# Patient Record
Sex: FEMALE | Race: WHITE | NOT HISPANIC OR LATINO | Employment: UNEMPLOYED | URBAN - METROPOLITAN AREA
[De-identification: names, ages, dates, MRNs, and addresses within clinical notes are randomized per-mention and may not be internally consistent; named-entity substitution may affect disease eponyms.]

---

## 2017-02-15 ENCOUNTER — HISTORICAL (OUTPATIENT)
Dept: LAB | Facility: HOSPITAL | Age: 27
End: 2017-02-15

## 2017-03-14 ENCOUNTER — HISTORICAL (OUTPATIENT)
Dept: LAB | Facility: HOSPITAL | Age: 27
End: 2017-03-14

## 2020-09-10 ENCOUNTER — HISTORICAL (OUTPATIENT)
Dept: RADIOLOGY | Facility: HOSPITAL | Age: 30
End: 2020-09-10

## 2022-09-27 ENCOUNTER — HOSPITAL ENCOUNTER (EMERGENCY)
Facility: HOSPITAL | Age: 32
Discharge: HOME | End: 2022-09-27
Attending: EMERGENCY MEDICINE
Payer: MEDICAID

## 2022-09-27 VITALS
BODY MASS INDEX: 27.68 KG/M2 | HEART RATE: 101 BPM | TEMPERATURE: 98 F | OXYGEN SATURATION: 98 % | SYSTOLIC BLOOD PRESSURE: 126 MMHG | WEIGHT: 141 LBS | HEIGHT: 60 IN | DIASTOLIC BLOOD PRESSURE: 83 MMHG | RESPIRATION RATE: 18 BRPM

## 2022-09-27 DIAGNOSIS — J02.9 PHARYNGITIS, UNSPECIFIED ETIOLOGY: Primary | ICD-10-CM

## 2022-09-27 LAB — STREP A PCR (OHS): NOT DETECTED

## 2022-09-27 PROCEDURE — 99284 EMERGENCY DEPT VISIT MOD MDM: CPT | Mod: 25

## 2022-09-27 PROCEDURE — 96372 THER/PROPH/DIAG INJ SC/IM: CPT

## 2022-09-27 PROCEDURE — 63600175 PHARM REV CODE 636 W HCPCS

## 2022-09-27 PROCEDURE — 87631 RESP VIRUS 3-5 TARGETS: CPT | Performed by: EMERGENCY MEDICINE

## 2022-09-27 RX ORDER — PREDNISONE 20 MG/1
20 TABLET ORAL DAILY
Qty: 5 TABLET | Refills: 0 | Status: SHIPPED | OUTPATIENT
Start: 2022-09-27 | End: 2022-10-02

## 2022-09-27 RX ORDER — DEXAMETHASONE SODIUM PHOSPHATE 4 MG/ML
8 INJECTION, SOLUTION INTRA-ARTICULAR; INTRALESIONAL; INTRAMUSCULAR; INTRAVENOUS; SOFT TISSUE
Status: COMPLETED | OUTPATIENT
Start: 2022-09-27 | End: 2022-09-27

## 2022-09-27 RX ADMIN — DEXAMETHASONE SODIUM PHOSPHATE 8 MG: 4 INJECTION, SOLUTION INTRA-ARTICULAR; INTRALESIONAL; INTRAMUSCULAR; INTRAVENOUS; SOFT TISSUE at 04:09

## 2022-09-27 NOTE — Clinical Note
"Rowena Frencha"Devonte was seen and treated in our emergency department on 9/27/2022.  She may return to work on 09/30/2022.       If you have any questions or concerns, please don't hesitate to call.      Lilo ALBARRAN    "

## 2022-09-27 NOTE — ED PROVIDER NOTES
Encounter Date: 9/27/2022       History     Chief Complaint   Patient presents with    Sore Throat     C/o sore throat pain for a few days and recent strep diagnosis for her child     The patient is a 32 y.o. female with no past medical history who presents to the Emergency Department with a chief complaint of sore throat. Symptoms began on Sunday and have been constant since onset. Her throat pain is currently rated as a 7/10 in severity and described as aching with no radiation. Associated symptoms include fever two days ago. Symptoms are aggravated with swallowing and there are no alleviating factors. The patient denies difficulty tolerating secretions. She reports taking nothing prior to arrival with no relief of symptoms. No other reported symptoms at this time. Her son tested positive for strep today.     The history is provided by the patient.   Sore Throat   This is a new problem. The sore throat symptoms include sore throat and fever.The current episode started two days ago. The problem has been unchanged. The maximum temperature recorded prior to her arrival was 100 - 100.9 F. The fever has been present for Less than 1 day. The pain is at a severity of 7/10. Pertinent negatives include no congestion, coughing, ear discharge, ear pain, headaches, neck pain, shortness of breath, stridor, trouble swallowing or vomiting. She has had exposure to strep. She has tried nothing for the symptoms. The treatment provided no relief.   Review of patient's allergies indicates:   Allergen Reactions    Codeine      History reviewed. No pertinent past medical history.  History reviewed. No pertinent surgical history.  No family history on file.     Review of Systems   HENT:  Positive for sore throat. Negative for congestion, ear discharge, ear pain, postnasal drip, rhinorrhea, sinus pressure, trouble swallowing and voice change.    Eyes:  Negative for discharge and itching.   Respiratory:  Negative for cough, shortness of  breath, wheezing and stridor.    Gastrointestinal:  Negative for vomiting.   Musculoskeletal:  Negative for neck pain.   Skin:  Negative for pallor and rash.   Neurological:  Negative for headaches.   All other systems reviewed and are negative.    Physical Exam     Initial Vitals [09/27/22 1610]   BP Pulse Resp Temp SpO2   126/83 101 18 97.7 °F (36.5 °C) 98 %      MAP       --         Physical Exam    Nursing note and vitals reviewed.  Constitutional: Vital signs are normal. She appears well-developed and well-nourished.   HENT:   Head: Normocephalic.   Right Ear: Tympanic membrane normal.   Left Ear: Tympanic membrane normal.   Nose: No sinus tenderness.   Mouth/Throat: Uvula is midline and mucous membranes are normal. No uvula swelling. Posterior oropharyngeal erythema present. No oropharyngeal exudate or tonsillar abscesses.   Enlarged tonsils   Eyes: Pupils are equal, round, and reactive to light.   Cardiovascular:  Normal rate, regular rhythm, normal heart sounds, intact distal pulses and normal pulses.           Pulmonary/Chest: Effort normal and breath sounds normal.     Lymphadenopathy:     She has no cervical adenopathy.   Neurological: She is alert. GCS eye subscore is 4. GCS verbal subscore is 5. GCS motor subscore is 6.   Skin: Skin is warm. Capillary refill takes less than 2 seconds.       ED Course   Procedures  Labs Reviewed   STREP GROUP A BY PCR - Normal          Imaging Results    None          Medications   dexamethasone injection 8 mg (8 mg Intramuscular Given 9/27/22 1630)     Medical Decision Making:   Differential Diagnosis:   Strep, pharyngitis, URI  Clinical Tests:   Lab Tests: Ordered and Reviewed  ED Management:  The patient is a 32 y.o. female who presents to the ED with a chief complaint of throat pain. The patient's initial vital signs reviewed. Upon arrival, he appears nontoxic . Epic records were reviewed. Multiple diagnosis considered. Physical examination was significant for  enlarged tonsils and posterior oropharyngeal erythema. Evaluation consist of rapid strep with negative results. She declined COVID testing. The patient was provided with decadron resulting in improvement of symptoms.  The patient's vital signs and condition are stable while undergoing evaluation in the ED.  The patient was advised to follow up with her primary care provider. It was recommended for the patient to call her PCP to schedule a follow up appointment and to return to the Emergency Department as needed if symptoms worsen. The patient was provided prescriptions for prednisone. The patient's vital signs and condition were closely observed while undergoing evaluation in the Emergency Department. The patient agreed with the plan for care. All questions and concerns were addressed                           Clinical Impression:   Final diagnoses:  [J02.9] Pharyngitis, unspecified etiology (Primary)      ED Disposition Condition    Discharge Stable          ED Prescriptions       Medication Sig Dispense Start Date End Date Auth. Provider    predniSONE (DELTASONE) 20 MG tablet Take 1 tablet (20 mg total) by mouth once daily. for 5 days 5 tablet 9/27/2022 10/2/2022 Coco Licona NP          Follow-up Information       Follow up With Specialties Details Why Contact Info    Primary Care Provider  Schedule an appointment as soon as possible for a visit  As needed, If symptoms worsen              Coco Licona NP  09/27/22 [id number]       Coco Licona NP  09/27/22 0550

## 2022-12-11 ENCOUNTER — HOSPITAL ENCOUNTER (EMERGENCY)
Facility: HOSPITAL | Age: 32
Discharge: HOME | End: 2022-12-11
Attending: FAMILY MEDICINE
Payer: MEDICAID

## 2022-12-11 VITALS
BODY MASS INDEX: 28.07 KG/M2 | OXYGEN SATURATION: 98 % | TEMPERATURE: 98 F | SYSTOLIC BLOOD PRESSURE: 123 MMHG | WEIGHT: 143 LBS | HEIGHT: 60 IN | DIASTOLIC BLOOD PRESSURE: 80 MMHG | HEART RATE: 88 BPM | RESPIRATION RATE: 20 BRPM
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
## 2022-12-11 DIAGNOSIS — M54.6 ACUTE BILATERAL THORACIC BACK PAIN: ICD-10-CM

## 2022-12-11 DIAGNOSIS — R51.9 NONINTRACTABLE HEADACHE, UNSPECIFIED CHRONICITY PATTERN, UNSPECIFIED HEADACHE TYPE: Primary | ICD-10-CM

## 2022-12-11 PROCEDURE — 99284 EMERGENCY DEPT VISIT MOD MDM: CPT

## 2022-12-11 PROCEDURE — 25000003 PHARM REV CODE 250: Performed by: PHYSICIAN ASSISTANT

## 2022-12-11 RX ORDER — KETOROLAC TROMETHAMINE 10 MG/1
10 TABLET, FILM COATED ORAL
Status: COMPLETED | OUTPATIENT
Start: 2022-12-11 | End: 2022-12-11

## 2022-12-11 RX ORDER — MEDROXYPROGESTERONE ACETATE 150 MG/ML
INJECTION, SUSPENSION INTRAMUSCULAR
COMMUNITY
Start: 2022-09-19

## 2022-12-11 RX ORDER — TIZANIDINE 4 MG/1
4 TABLET ORAL EVERY 8 HOURS
Qty: 21 TABLET | Refills: 0 | Status: SHIPPED | OUTPATIENT
Start: 2022-12-11 | End: 2022-12-18

## 2022-12-11 RX ORDER — DICLOFENAC SODIUM 50 MG/1
50 TABLET, DELAYED RELEASE ORAL 3 TIMES DAILY
Qty: 21 TABLET | Refills: 0 | Status: SHIPPED | OUTPATIENT
Start: 2022-12-11 | End: 2022-12-18

## 2022-12-11 RX ADMIN — KETOROLAC TROMETHAMINE 10 MG: 10 TABLET, FILM COATED ORAL at 02:12
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
## 2022-12-11 NOTE — DISCHARGE INSTRUCTIONS
Use ice and heat therapy, 20 minutes on and 20 minutes off.    You have been prescribed Diclofenac for pain. This is an Non-Steroidal Anti-Inflammatory (NSAID) Medication. Please do not take any additional NSAIDs while you are taking this medication including (Advil, Aleve, Motrin, Ibuprofen, Mobic\meloxicam, Naprosyn, Toradol, ketoralac, etc.). Please stop taking this medication if you experience: weakness, itching, yellow skin or eyes, joint pains, vomiting blood, blood or black stools, unusual weight gain, or swelling in your arms, legs, hands, or feet.     You have been prescribed Tizanidine for muscle spasms/pain. Please do not take this medication while working, drinking alcohol, swimming, or while driving/operating heavy machinery. This medication may cause drowsiness, dizziness, impair judgment, and reduce physical capabilities.You should not drive, operate heavy machinery, or make life changing decisions while taking this medication.
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
## 2023-01-19 ENCOUNTER — HOSPITAL ENCOUNTER (EMERGENCY)
Facility: HOSPITAL | Age: 33
Discharge: HOME | End: 2023-01-19
Attending: STUDENT IN AN ORGANIZED HEALTH CARE EDUCATION/TRAINING PROGRAM
Payer: MEDICAID

## 2023-01-19 VITALS
SYSTOLIC BLOOD PRESSURE: 108 MMHG | HEIGHT: 60 IN | RESPIRATION RATE: 18 BRPM | BODY MASS INDEX: 28.13 KG/M2 | OXYGEN SATURATION: 99 % | DIASTOLIC BLOOD PRESSURE: 78 MMHG | TEMPERATURE: 98 F | HEART RATE: 80 BPM | WEIGHT: 143.31 LBS

## 2023-01-19 DIAGNOSIS — L23.7 CONTACT DERMATITIS DUE TO POISON IVY: Primary | ICD-10-CM

## 2023-01-19 PROCEDURE — 63600175 PHARM REV CODE 636 W HCPCS: Performed by: PHYSICIAN ASSISTANT

## 2023-01-19 PROCEDURE — 96372 THER/PROPH/DIAG INJ SC/IM: CPT | Performed by: PHYSICIAN ASSISTANT

## 2023-01-19 PROCEDURE — 99284 EMERGENCY DEPT VISIT MOD MDM: CPT

## 2023-01-19 RX ORDER — PREDNISONE 20 MG/1
20 TABLET ORAL DAILY
Qty: 3 TABLET | Refills: 0 | Status: SHIPPED | OUTPATIENT
Start: 2023-01-19 | End: 2023-01-22

## 2023-01-19 RX ORDER — DEXAMETHASONE SODIUM PHOSPHATE 4 MG/ML
8 INJECTION, SOLUTION INTRA-ARTICULAR; INTRALESIONAL; INTRAMUSCULAR; INTRAVENOUS; SOFT TISSUE
Status: COMPLETED | OUTPATIENT
Start: 2023-01-19 | End: 2023-01-19

## 2023-01-19 RX ADMIN — DEXAMETHASONE SODIUM PHOSPHATE 8 MG: 4 INJECTION, SOLUTION INTRA-ARTICULAR; INTRALESIONAL; INTRAMUSCULAR; INTRAVENOUS; SOFT TISSUE at 08:01

## 2023-01-20 NOTE — ED PROVIDER NOTES
Encounter Date: 2023       History     Chief Complaint   Patient presents with    Rash     Rash to arms. States its poison ivy     See MDM    The history is provided by the patient. No  was used.   Review of patient's allergies indicates:   Allergen Reactions    Codeine      No past medical history on file.  Past Surgical History:   Procedure Laterality Date     SECTION       SECTION       No family history on file.  Social History     Tobacco Use    Smoking status: Never    Smokeless tobacco: Never     Review of Systems   Constitutional:  Negative for fever.   HENT:  Negative for sore throat.    Respiratory:  Negative for shortness of breath.    Cardiovascular:  Negative for chest pain.   Gastrointestinal:  Negative for nausea.   Genitourinary:  Negative for dysuria.   Musculoskeletal:  Positive for back pain and myalgias.   Skin:  Negative for rash.   Neurological:  Negative for weakness.   Hematological:  Does not bruise/bleed easily.   All other systems reviewed and are negative.    Physical Exam     Initial Vitals [23]   BP Pulse Resp Temp SpO2   108/78 80 18 98.3 °F (36.8 °C) 99 %      MAP       --         Physical Exam    Nursing note and vitals reviewed.  Constitutional: She appears well-developed and well-nourished.   HENT:   Head: Normocephalic and atraumatic.   Eyes: EOM are normal. Pupils are equal, round, and reactive to light.   Neck: Neck supple.   Cardiovascular:  Normal rate and regular rhythm.           Pulmonary/Chest: Breath sounds normal. She has no wheezes.   Musculoskeletal:         General: Normal range of motion.      Cervical back: Neck supple.     Neurological: She is alert and oriented to person, place, and time. She has normal strength. GCS score is 15. GCS eye subscore is 4. GCS verbal subscore is 5. GCS motor subscore is 6.   Skin: Skin is warm and dry.        Psychiatric: She has a normal mood and affect.       ED Course    Procedures  Labs Reviewed - No data to display       Imaging Results    None          Medications   dexAMETHasone injection 8 mg (8 mg Intramuscular Given 1/19/23 2018)     Medical Decision Making:   Initial Assessment:   32-year-old female presents to the ED with rash to left arm secondary to contact with poison ivy onset 1 week ago.  Patient states she is allergic to poison ivy and notes she had similar episode a while back, however notes it was much worse and more diffuse.  States she went near the same area in her backyard and picked up some bricks however did not realize she was touching the plan.  States she is been doing calamine lotion at home with no relief.  Patient is requesting steroid shot at this time.  Differential Diagnosis:   Rash, contact dermatitis, blistering  Clinical Tests:   Lab Tests: Ordered and Reviewed                            Clinical Impression:   Final diagnoses:  [L23.7] Contact dermatitis due to poison ivy (Primary)        ED Disposition Condition    Discharge Stable          ED Prescriptions       Medication Sig Dispense Start Date End Date Auth. Provider    predniSONE (DELTASONE) 20 MG tablet Take 1 tablet (20 mg total) by mouth once daily. for 3 days 3 tablet 1/19/2023 1/22/2023 Rogelio Coleman PA-C          Follow-up Information       Follow up With Specialties Details Why Contact Info    [facility] - Emergency Dept Emergency Medicine In 1 week If symptoms worsen [id number]  [facility] [id number] [phone]    Primary care provider  In 2 days As needed              Rogelio Coleman PA-C  01/19/23 9835

## 2023-10-19 DIAGNOSIS — K42.9 UMBILICAL HERNIA WITHOUT OBSTRUCTION OR GANGRENE: Primary | ICD-10-CM

## 2023-11-07 ENCOUNTER — OFFICE VISIT (OUTPATIENT)
Dept: SURGERY | Facility: CLINIC | Age: 33
End: 2023-11-07
Payer: MEDICAID

## 2023-11-07 VITALS
HEIGHT: 60 IN | DIASTOLIC BLOOD PRESSURE: 77 MMHG | WEIGHT: 142 LBS | SYSTOLIC BLOOD PRESSURE: 123 MMHG | RESPIRATION RATE: 19 BRPM | OXYGEN SATURATION: 97 % | HEART RATE: 108 BPM | TEMPERATURE: 98 F | BODY MASS INDEX: 27.88 KG/M2

## 2023-11-07 DIAGNOSIS — Z01.818 PRE-OP TESTING: ICD-10-CM

## 2023-11-07 DIAGNOSIS — K42.9 UMBILICAL HERNIA WITHOUT OBSTRUCTION OR GANGRENE: ICD-10-CM

## 2023-11-07 PROCEDURE — 99215 OFFICE O/P EST HI 40 MIN: CPT | Mod: PBBFAC

## 2023-11-07 RX ORDER — SODIUM CHLORIDE 9 MG/ML
INJECTION, SOLUTION INTRAVENOUS CONTINUOUS
Status: CANCELLED | OUTPATIENT
Start: 2023-11-07

## 2023-11-07 RX ORDER — HEPARIN SODIUM 5000 [USP'U]/ML
5000 INJECTION, SOLUTION INTRAVENOUS; SUBCUTANEOUS
Status: CANCELLED | OUTPATIENT
Start: 2023-11-07 | End: 2023-11-07

## 2023-11-07 RX ORDER — CEFAZOLIN SODIUM 2 G/50ML
2 SOLUTION INTRAVENOUS
Status: CANCELLED | OUTPATIENT
Start: 2023-11-07

## 2023-11-07 NOTE — PROGRESS NOTES
Patient seen by Dr. Calvillo. Patient instructed to RTC 2 weeks for post op after surgery on Nov. 20th. Consents signed and education given.

## 2023-11-07 NOTE — PROGRESS NOTES
I have reviewed the notes, assessments, and/or procedures performed by the resident, I do not concur with her/his documentation of Rowena Whitney.     Roseann Gabriel MD

## 2023-11-07 NOTE — PROGRESS NOTES
[facility]  Post Op Visit    SUBJECTIVE:  Rowena Whitney is a 33 y.o. y/o female w/ no significant PMHx who presents to clinic today evaluation of umbilical hernia that has been present for the past 3 years. Pt states it has slowly gotten bigger but remains asymptomatic. She is eager for surgical repair due concerns that it will worsen.   Surgical history include  6 years ago and liposuction 3 years ago.   Pt denies incarceration, changes in bowel movements, or overlying skin changes. US performed which showed 1.1cm defect, fat containing hernia.     Pt does not smoke and is an occasional drinker     OBJECTIVE:  Vitals:    23 1005   BP: 123/77   Pulse: 108   Resp: 19   Temp: 98.1 °F (36.7 °C)       General: female in NAD. Not toxic appearing.   HENT: EOM intact.   Pulmonary: No respiratory distress. Effort normal.  Cardiovascular: Regular rate.   Abdomen: soft, non-tender, non-distended, reducible slightly tender umbilical mass         Imaging:   FINDINGS: There is a fat-containing defect within the anterior abdominal wall with the defect measuring 1.1 cm in greatest dimension and the hernia sac measuring 2.3 x 2.3 x 0.9 cm.      ASSESSMENT/PLAN:    Rowena Whitney is a 33 y.o. y/o female who presents to clinic today for evaluation of umbilical hernia.     -Will plan for open primary umbilical hernia repair on    -Pt consented   All questions answered; patient is comfortable with the above follow-up plan and verbalized understanding.      Mack Calvillo MD   PGY-1   [employer] General Surgery

## 2023-11-19 ENCOUNTER — ANESTHESIA EVENT (OUTPATIENT)
Dept: SURGERY | Facility: HOSPITAL | Age: 33
End: 2023-11-19
Payer: MEDICAID

## 2023-11-20 ENCOUNTER — ANESTHESIA (OUTPATIENT)
Dept: SURGERY | Facility: HOSPITAL | Age: 33
End: 2023-11-20
Payer: MEDICAID

## 2023-11-20 ENCOUNTER — HOSPITAL ENCOUNTER (OUTPATIENT)
Facility: HOSPITAL | Age: 33
Discharge: HOME | End: 2023-11-20
Attending: SURGERY | Admitting: SURGERY
Payer: MEDICAID

## 2023-11-20 VITALS
WEIGHT: 142 LBS | HEIGHT: 60 IN | OXYGEN SATURATION: 99 % | HEART RATE: 69 BPM | BODY MASS INDEX: 27.88 KG/M2 | RESPIRATION RATE: 18 BRPM | DIASTOLIC BLOOD PRESSURE: 74 MMHG | TEMPERATURE: 97 F | SYSTOLIC BLOOD PRESSURE: 102 MMHG

## 2023-11-20 DIAGNOSIS — K42.9 UMBILICAL HERNIA WITHOUT OBSTRUCTION OR GANGRENE: ICD-10-CM

## 2023-11-20 LAB
B-HCG UR QL: NEGATIVE
CTP QC/QA: YES

## 2023-11-20 PROCEDURE — D9220A PRA ANESTHESIA: ICD-10-PCS | Mod: ANES,,, | Performed by: ANESTHESIOLOGY

## 2023-11-20 PROCEDURE — 36000706: Performed by: SURGERY

## 2023-11-20 PROCEDURE — 63600175 PHARM REV CODE 636 W HCPCS: Performed by: SURGERY

## 2023-11-20 PROCEDURE — 81025 URINE PREGNANCY TEST: CPT | Performed by: ANESTHESIOLOGY

## 2023-11-20 PROCEDURE — D9220A PRA ANESTHESIA: ICD-10-PCS | Mod: CRNA,,, | Performed by: NURSE ANESTHETIST, CERTIFIED REGISTERED

## 2023-11-20 PROCEDURE — 37000009 HC ANESTHESIA EA ADD 15 MINS: Performed by: SURGERY

## 2023-11-20 PROCEDURE — 63600175 PHARM REV CODE 636 W HCPCS: Performed by: ANESTHESIOLOGY

## 2023-11-20 PROCEDURE — D9220A PRA ANESTHESIA: Mod: ANES,,, | Performed by: ANESTHESIOLOGY

## 2023-11-20 PROCEDURE — 37000008 HC ANESTHESIA 1ST 15 MINUTES: Performed by: SURGERY

## 2023-11-20 PROCEDURE — 63600175 PHARM REV CODE 636 W HCPCS: Performed by: NURSE ANESTHETIST, CERTIFIED REGISTERED

## 2023-11-20 PROCEDURE — 25000003 PHARM REV CODE 250: Performed by: NURSE ANESTHETIST, CERTIFIED REGISTERED

## 2023-11-20 PROCEDURE — D9220A PRA ANESTHESIA: Mod: CRNA,,, | Performed by: NURSE ANESTHETIST, CERTIFIED REGISTERED

## 2023-11-20 PROCEDURE — 36000707: Performed by: SURGERY

## 2023-11-20 PROCEDURE — 63600175 PHARM REV CODE 636 W HCPCS

## 2023-11-20 PROCEDURE — 71000033 HC RECOVERY, INTIAL HOUR: Performed by: SURGERY

## 2023-11-20 PROCEDURE — 71000016 HC POSTOP RECOV ADDL HR: Performed by: SURGERY

## 2023-11-20 PROCEDURE — 71000015 HC POSTOP RECOV 1ST HR: Performed by: SURGERY

## 2023-11-20 RX ORDER — OXYCODONE HYDROCHLORIDE 5 MG/1
5 TABLET ORAL
Status: DISCONTINUED | OUTPATIENT
Start: 2023-11-20 | End: 2023-11-20 | Stop reason: HOSPADM

## 2023-11-20 RX ORDER — ONDANSETRON 2 MG/ML
INJECTION INTRAMUSCULAR; INTRAVENOUS
Status: DISCONTINUED | OUTPATIENT
Start: 2023-11-20 | End: 2023-11-20

## 2023-11-20 RX ORDER — MIDAZOLAM HYDROCHLORIDE 1 MG/ML
2 INJECTION INTRAMUSCULAR; INTRAVENOUS ONCE AS NEEDED
Status: COMPLETED | OUTPATIENT
Start: 2023-11-20 | End: 2023-11-20

## 2023-11-20 RX ORDER — NEOSTIGMINE METHYLSULFATE 1 MG/ML
INJECTION, SOLUTION INTRAVENOUS
Status: DISCONTINUED | OUTPATIENT
Start: 2023-11-20 | End: 2023-11-20

## 2023-11-20 RX ORDER — HYDROCODONE BITARTRATE AND ACETAMINOPHEN 5; 325 MG/1; MG/1
1 TABLET ORAL EVERY 6 HOURS PRN
Qty: 10 TABLET | Refills: 0 | Status: SHIPPED | OUTPATIENT
Start: 2023-11-20

## 2023-11-20 RX ORDER — HEPARIN SODIUM 5000 [USP'U]/ML
5000 INJECTION, SOLUTION INTRAVENOUS; SUBCUTANEOUS
Status: COMPLETED | OUTPATIENT
Start: 2023-11-20 | End: 2023-11-20

## 2023-11-20 RX ORDER — GLYCOPYRROLATE 0.2 MG/ML
INJECTION INTRAMUSCULAR; INTRAVENOUS
Status: DISCONTINUED | OUTPATIENT
Start: 2023-11-20 | End: 2023-11-20

## 2023-11-20 RX ORDER — CEFAZOLIN SODIUM 1 G/3ML
2 INJECTION, POWDER, FOR SOLUTION INTRAMUSCULAR; INTRAVENOUS
Status: COMPLETED | OUTPATIENT
Start: 2023-11-20 | End: 2023-11-20

## 2023-11-20 RX ORDER — KETOROLAC TROMETHAMINE 30 MG/ML
INJECTION, SOLUTION INTRAMUSCULAR; INTRAVENOUS
Status: DISCONTINUED | OUTPATIENT
Start: 2023-11-20 | End: 2023-11-20

## 2023-11-20 RX ORDER — ONDANSETRON 2 MG/ML
4 INJECTION INTRAMUSCULAR; INTRAVENOUS DAILY PRN
Status: DISCONTINUED | OUTPATIENT
Start: 2023-11-20 | End: 2023-11-20 | Stop reason: HOSPADM

## 2023-11-20 RX ORDER — SODIUM CHLORIDE 9 MG/ML
INJECTION, SOLUTION INTRAVENOUS CONTINUOUS
Status: DISCONTINUED | OUTPATIENT
Start: 2023-11-20 | End: 2023-11-20 | Stop reason: HOSPADM

## 2023-11-20 RX ORDER — SODIUM CHLORIDE, SODIUM LACTATE, POTASSIUM CHLORIDE, CALCIUM CHLORIDE 600; 310; 30; 20 MG/100ML; MG/100ML; MG/100ML; MG/100ML
INJECTION, SOLUTION INTRAVENOUS CONTINUOUS
Status: DISCONTINUED | OUTPATIENT
Start: 2023-11-20 | End: 2023-11-20 | Stop reason: HOSPADM

## 2023-11-20 RX ORDER — MEPERIDINE HYDROCHLORIDE 25 MG/ML
12.5 INJECTION INTRAMUSCULAR; INTRAVENOUS; SUBCUTANEOUS EVERY 10 MIN PRN
Status: DISCONTINUED | OUTPATIENT
Start: 2023-11-20 | End: 2023-11-20 | Stop reason: HOSPADM

## 2023-11-20 RX ORDER — DEXMEDETOMIDINE HYDROCHLORIDE 100 UG/ML
INJECTION, SOLUTION INTRAVENOUS
Status: DISCONTINUED | OUTPATIENT
Start: 2023-11-20 | End: 2023-11-20

## 2023-11-20 RX ORDER — ROCURONIUM BROMIDE 10 MG/ML
INJECTION, SOLUTION INTRAVENOUS
Status: DISCONTINUED | OUTPATIENT
Start: 2023-11-20 | End: 2023-11-20

## 2023-11-20 RX ORDER — MORPHINE SULFATE 2 MG/ML
2 INJECTION, SOLUTION INTRAMUSCULAR; INTRAVENOUS EVERY 5 MIN PRN
Status: DISCONTINUED | OUTPATIENT
Start: 2023-11-20 | End: 2023-11-20 | Stop reason: HOSPADM

## 2023-11-20 RX ORDER — PROPOFOL 10 MG/ML
VIAL (ML) INTRAVENOUS
Status: DISCONTINUED | OUTPATIENT
Start: 2023-11-20 | End: 2023-11-20

## 2023-11-20 RX ORDER — DEXAMETHASONE SODIUM PHOSPHATE 4 MG/ML
INJECTION, SOLUTION INTRA-ARTICULAR; INTRALESIONAL; INTRAMUSCULAR; INTRAVENOUS; SOFT TISSUE
Status: DISCONTINUED | OUTPATIENT
Start: 2023-11-20 | End: 2023-11-20

## 2023-11-20 RX ORDER — FENTANYL CITRATE 50 UG/ML
INJECTION, SOLUTION INTRAMUSCULAR; INTRAVENOUS
Status: DISCONTINUED | OUTPATIENT
Start: 2023-11-20 | End: 2023-11-20

## 2023-11-20 RX ORDER — SODIUM CHLORIDE 0.9 % (FLUSH) 0.9 %
10 SYRINGE (ML) INJECTION
Status: DISCONTINUED | OUTPATIENT
Start: 2023-11-20 | End: 2023-11-20 | Stop reason: HOSPADM

## 2023-11-20 RX ORDER — LIDOCAINE HYDROCHLORIDE 20 MG/ML
INJECTION INTRAVENOUS
Status: DISCONTINUED | OUTPATIENT
Start: 2023-11-20 | End: 2023-11-20

## 2023-11-20 RX ORDER — BUPIVACAINE HYDROCHLORIDE 2.5 MG/ML
INJECTION, SOLUTION EPIDURAL; INFILTRATION; INTRACAUDAL
Status: DISCONTINUED | OUTPATIENT
Start: 2023-11-20 | End: 2023-11-20 | Stop reason: HOSPADM

## 2023-11-20 RX ORDER — ONDANSETRON 4 MG/1
4 TABLET, FILM COATED ORAL 2 TIMES DAILY
Qty: 15 TABLET | Refills: 0 | Status: SHIPPED | OUTPATIENT
Start: 2023-11-20

## 2023-11-20 RX ADMIN — ROCURONIUM BROMIDE 30 MG: 10 INJECTION INTRAVENOUS at 08:11

## 2023-11-20 RX ADMIN — SODIUM CHLORIDE, POTASSIUM CHLORIDE, SODIUM LACTATE AND CALCIUM CHLORIDE: 600; 310; 30; 20 INJECTION, SOLUTION INTRAVENOUS at 08:11

## 2023-11-20 RX ADMIN — MIDAZOLAM HYDROCHLORIDE 2 MG: 1 INJECTION, SOLUTION INTRAMUSCULAR; INTRAVENOUS at 08:11

## 2023-11-20 RX ADMIN — CEFAZOLIN 2 G: 330 INJECTION, POWDER, FOR SOLUTION INTRAMUSCULAR; INTRAVENOUS at 08:11

## 2023-11-20 RX ADMIN — LIDOCAINE HYDROCHLORIDE 60 MG: 20 INJECTION INTRAVENOUS at 08:11

## 2023-11-20 RX ADMIN — HEPARIN SODIUM 5000 UNITS: 5000 INJECTION, SOLUTION INTRAVENOUS; SUBCUTANEOUS at 06:11

## 2023-11-20 RX ADMIN — PROPOFOL 150 MG: 10 INJECTION, EMULSION INTRAVENOUS at 08:11

## 2023-11-20 RX ADMIN — ONDANSETRON 4 MG: 2 INJECTION INTRAMUSCULAR; INTRAVENOUS at 10:11

## 2023-11-20 RX ADMIN — GLYCOPYRROLATE 0.4 MG: 0.2 INJECTION INTRAMUSCULAR; INTRAVENOUS at 09:11

## 2023-11-20 RX ADMIN — DEXAMETHASONE SODIUM PHOSPHATE 8 MG: 4 INJECTION, SOLUTION INTRA-ARTICULAR; INTRALESIONAL; INTRAMUSCULAR; INTRAVENOUS; SOFT TISSUE at 08:11

## 2023-11-20 RX ADMIN — DEXMEDETOMIDINE 20 MCG: 200 INJECTION, SOLUTION INTRAVENOUS at 08:11

## 2023-11-20 RX ADMIN — KETOROLAC TROMETHAMINE 30 MG: 30 INJECTION, SOLUTION INTRAMUSCULAR at 08:11

## 2023-11-20 RX ADMIN — ONDANSETRON 4 MG: 2 INJECTION INTRAMUSCULAR; INTRAVENOUS at 09:11

## 2023-11-20 RX ADMIN — FENTANYL CITRATE 100 MCG: 50 INJECTION INTRAMUSCULAR; INTRAVENOUS at 08:11

## 2023-11-20 RX ADMIN — NEOSTIGMINE METHYLSULFATE 3 MG: 1 INJECTION INTRAVENOUS at 09:11

## 2023-11-20 NOTE — ANESTHESIA PROCEDURE NOTES
Intubation    Date/Time: 11/20/2023 8:42 AM    Performed by: Lavelle Wall CRNA  Authorized by: Katie Leyva MD    Intubation:     Induction:  Intravenous    Intubated:  Postinduction    Mask Ventilation:  Easy mask    Attempts:  1    Attempted By:  CRNA    Method of Intubation:  Direct    Blade:  Stephanie 4    Laryngeal View Grade: Grade I - full view of cords      Difficult Airway Encountered?: No      Complications:  None    Airway Device:  Oral endotracheal tube    Airway Device Size:  7.5    Style/Cuff Inflation:  Cuffed (inflated to minimal occlusive pressure)    Inflation Amount (mL):  7    Tube secured:  22    Secured at:  The lips    Placement Verified By:  Capnometry    Complicating Factors:  None    Findings Post-Intubation:  BS equal bilateral and atraumatic/condition of teeth unchanged

## 2023-11-20 NOTE — ANESTHESIA POSTPROCEDURE EVALUATION
Anesthesia Post Evaluation    Patient: Rowena Nayak Devonte    Procedure(s) Performed: Procedure(s) (LRB):  REPAIR, HERNIA, UMBILICAL, AGE 5 YEARS OR OLDER (N/A)    Final Anesthesia Type: general      Patient location during evaluation: DOSC  Post-procedure vital signs: reviewed and stable  Airway patency: patent      Anesthetic complications: no      Cardiovascular status: hemodynamically stable  Respiratory status: spontaneous ventilation  Follow-up not needed.          Vitals Value Taken Time   /74 11/20/23 1100   Temp 36.1 °C (97 °F) 11/20/23 0958   Pulse 69 11/20/23 1100   Resp 18 11/20/23 1100   SpO2 99 % 11/20/23 1100         Event Time   Out of Recovery 09:57:00         Pain/Corazon Score: Pain Rating Post Med Admin: -- (asleep) (11/20/2023 11:13 AM)  Corazon Score: 9 (11/20/2023  9:58 AM)  Modified Corazon Score: 20 (11/20/2023 10:38 AM)

## 2023-11-20 NOTE — ANESTHESIA PREPROCEDURE EVALUATION
11/19/2023  Rowena Whitney is a 33 y.o., female with no significant PMHx presents for umbilical hernia repair.    NO BETA BLOCKER USE    Active Ambulatory Problems     Diagnosis Date Noted    No Active Ambulatory Problems     Resolved Ambulatory Problems     Diagnosis Date Noted    No Resolved Ambulatory Problems     No Additional Past Medical History       Pre-op Assessment    I have reviewed the NPO Status.      Review of Systems  Anesthesia Hx:  No problems with previous Anesthesia                Social:  Non-Smoker       Cardiovascular:  Cardiovascular Normal                                            Pulmonary:  Pulmonary Normal                       Renal/:  Renal/ Normal                 Hepatic/GI:  Hepatic/GI Normal                 Neurological:  Neurology Normal                                      Endocrine:  Endocrine Normal              Vitals:    11/20/23 0601 11/20/23 0614 11/20/23 0814   BP:  118/78 113/84   BP Location:   Right arm   Pulse:  84 72   Resp:   18   Temp:  36.8 °C (98.2 °F) 37.2 °C (99 °F)   TempSrc:  Oral Temporal   SpO2:  97% 97%   Weight: 64.4 kg (142 lb)     Height: 5' (1.524 m)           Physical Exam  General: Alert, Cooperative and Well nourished    Airway:  Mallampati: II   Mouth Opening: Normal  TM Distance: Normal  Tongue: Normal  Neck ROM: Normal ROM    Dental:  Intact    Chest/Lungs:  Normal Respiratory Rate, Clear to auscultation    Heart:  Rate: Normal  Rhythm: Regular Rhythm  Sounds: Normal      Lab Results   Component Value Date    WBC 11.1 05/30/2019    HGB 13.4 05/30/2019    HCT 39.5 05/30/2019    MCV 92.7 05/30/2019     05/30/2019       CMP  Sodium Level   Date Value Ref Range Status   05/30/2019 138 136 - 145 mmol/L Final     Potassium Level   Date Value Ref Range Status   05/30/2019 3.6 3.5 - 5.1 mmol/L Final     Carbon Dioxide   Date  Value Ref Range Status   05/30/2019 28.0 21.0 - 32.0 mmol/L Final     Blood Urea Nitrogen   Date Value Ref Range Status   05/30/2019 20.0 (H) 7.0 - 18.0 mg/dL Final     Creatinine   Date Value Ref Range Status   05/30/2019 0.59 0.55 - 1.02 mg/dL Final     Calcium Level Total   Date Value Ref Range Status   05/30/2019 8.5 8.5 - 10.1 mg/dL Final     Albumin Level   Date Value Ref Range Status   05/30/2019 3.6 3.4 - 5.0 gm/dL Final     Bilirubin Total   Date Value Ref Range Status   05/30/2019 0.3 0.2 - 1.0 mg/dL Final     Alkaline Phosphatase   Date Value Ref Range Status   05/30/2019 162 (H) 45 - 117 unit/L Final     Aspartate Aminotransferase   Date Value Ref Range Status   05/30/2019 14 (L) 15 - 37 unit/L Final     Alanine Aminotransferase   Date Value Ref Range Status   05/30/2019 23 13 - 56 unit/L Final      Latest Reference Range & Units 11/20/23 06:21   Preg Test, Ur Negative  Negative         Anesthesia Plan  Type of Anesthesia, risks & benefits discussed:    Anesthesia Type: Gen ETT  Intra-op Monitoring Plan: Standard ASA Monitors  Post Op Pain Control Plan: IV/PO Opioids PRN  Induction:  IV  Airway Plan: Direct  Informed Consent: Informed consent signed with the Patient and all parties understand the risks and agree with anesthesia plan.  All questions answered.   ASA Score: 1  Day of Surgery Review of History & Physical: H&P Update referred to the surgeon/provider.    Ready For Surgery From Anesthesia Perspective.     .

## 2023-11-20 NOTE — OP NOTE
REPAIR, HERNIA, UMBILICAL, AGE 5 YEARS OR OLDER Procedure Note  Rowena Whitney  MRN:  [id number]  : 1990  Procedure Date: 2023    Procedure: Umbilical hernia repair    Pre-op Dx: Umbilical hernia    Post-op Dx: As above     Staff: Juan David Bautista MD  Resident Surgeon: Rogelio Bernard MD PGY3  Assistant: Rual Whitlock MD PGY1    Anesthesia: GETA    Indication for Procedure: Rowena Whitney is a 34 yo F who presented for umbilical hernia repair.     Procedure Details   Patient was taken to the operating room and placed supine on the operating table. General anesthesia was induced. The abdomen was prepped and draped in the usual sterile fashion. A time out was performed confirming correct patient, site. Preoperative antibiotics and heparin were given. An infraumbilical incision was made. Dissection was carried down to the level of the fascia using electrocautery. A hemostat was used to dissect the stalk of the hernia sac circumferentially. The hernia sac was divided taking care to inspect the contents. There was noted to be a small amount of fat within the hernia. This was reduced. The edges of the fascial defect were cleared circumferentially. The defect was noted to be approximately 1 cm in size. This defect was closed in an interrupted fashion using 0 Ethibond. Local anesthetic was injected. Hemostasis was achieved. The umbilicus was tacked down to the fascia using 3-0 Vicryl. The incision was closed in multiple layers using 3-0 Vicryl and 4-0 Monocryl. Dermabond was applied.     Findings: 1 cm fat containing umbilical hernia      Estimated Blood Loss:  5 ml       Drains: none           Specimens: None           Implants: None     Complications:  None           Disposition: PACU - hemodynamically stable.           Condition: stable    Juan David Bautista Jr., MD  Phone Number: [phone]      Rogelio Bernard MD  [facility] General Surgery, PGY-3

## 2023-11-20 NOTE — INTERVAL H&P NOTE
H&P Update    Patient seen and examined this morning. There are no changes to the documented H&P.    Patient has held home blood thinners for appropriate amount of time: N/A    Planned procedure: REPAIR, HERNIA, UMBILICAL, AGE 5 YEARS OR OLDER    Positioning: Supine    Pre-operative Heparin Ordered: Yes    Pre-operative Antibiotics Ordered: Yes: Ancef    Laterality Marked: N/A    Raul Whitlock MD  6:20 AM  11/20/2023

## 2023-11-20 NOTE — TRANSFER OF CARE
Anesthesia Transfer of Care Note    Patient: Rowena Whitney    Procedure(s) Performed: Procedure(s) (LRB):  REPAIR, HERNIA, UMBILICAL, AGE 5 YEARS OR OLDER (N/A)    Patient location: PACU    Anesthesia Type: general    Transport from OR: Transported from OR on room air with adequate spontaneous ventilation    Post pain: adequate analgesia    Post assessment: no apparent anesthetic complications    Post vital signs: stable    Level of consciousness: sedated    Nausea/Vomiting: no nausea/vomiting    Complications: none    Transfer of care protocol was followed      Last vitals: Visit Vitals  /84 (BP Location: Right arm)   Pulse 72   Temp 37.2 °C (99 °F) (Temporal)   Resp 18   Ht 5' (1.524 m)   Wt 64.4 kg (142 lb)   LMP  (LMP Unknown)   SpO2 97%   Breastfeeding No   BMI 27.73 kg/m²

## 2023-11-20 NOTE — DISCHARGE INSTRUCTIONS
***** PLEASE KEEP THESE POST OP INSTRUCTIONS WITH YOU UNTIL YOUR FOLLOW-UP APPOINTMENT *****    · FOLLOW UP: [facility] in [facility]).    · PAIN: Apply ice pack to abdomen as needed for pain. Alternate Tylenol and Ibuprofen (regular over the counter- follow instructions on the bottle). Take Your prescription pain medication AS PRESCRIBED ONLY for severe pain unrelieved by Tylenol (acetaminophen) or Ibuprofen.    · EXAMPLE: Take Tylenol. Three hours later take Ibuprofen. Three hours after that take Tylenol again, and repeat until no longer needed. If Pain is still bad once you start Tylenol and Ibuprofen, add pain medication. Dont drive or drink alcohol with pain medication. Dont take pain medication more often than it is prescribed to be taken.    INFECTION: Call your doctor at [phone] or report to the emergency room:    - if redness around your incision begins to spread, or you have swelling.    - If your incision has opened up    - If you have green, yellow, or cottage cheese-like drainage coming from your incision    - If you begin to run fever over 100.4 F    - if you are feeling weaker    - INCISION (WOUND) CARE: Leave adhesive glue on your skin until the glue peels away on its own. You may take a shower tomorrow. Wash gently over incision site - do not scrub or peel skin glue. Do not soak your wounds in water for 2 weeks. Do not take baths, swim, or use a hot tub until your doctor says it is okay.    · NAUSEA: You can eat and drink whatever you like as tolerated. You may experience post anesthesia nausea. Apply cold cloths to your face and neck and call your doctor for a prescription for nausea medication. If you have any uncontrolled vomiting that is not resolving within a few hours, report to your emergency room. Resume all medications tomorrow.    · ACTIVITY: Do not lift anything that is heavier than 15 lbs for 6 week. Return to work when you feel well enough: your pain is controlled without  the narcotic pain medication and you feel strong enough. Do not drink alcohol or drive today, or as long as you are on pain medication.    · Notify MD of any moderate to severe pain unrelieved by pain medicine or for any signs of infection including fever above 100.4, excessive redness or swelling, yellow/green foul- smelling drainage, nausea or vomiting. Clinics number is [phone]. If it is after business hours or emergency call [phone] and state Im having post op complications and need to speak to the surgeon on call.    · Thanks for choosing [facility]! Have a smooth recovery!

## 2023-12-05 ENCOUNTER — OFFICE VISIT (OUTPATIENT)
Dept: SURGERY | Facility: CLINIC | Age: 33
End: 2023-12-05
Payer: MEDICAID

## 2023-12-05 VITALS
HEART RATE: 71 BPM | DIASTOLIC BLOOD PRESSURE: 80 MMHG | HEIGHT: 60 IN | BODY MASS INDEX: 27.88 KG/M2 | OXYGEN SATURATION: 98 % | SYSTOLIC BLOOD PRESSURE: 116 MMHG | RESPIRATION RATE: 18 BRPM | WEIGHT: 142 LBS

## 2023-12-05 DIAGNOSIS — Z09 S/P UMBILICAL HERNIA REPAIR, FOLLOW-UP EXAM: Primary | ICD-10-CM

## 2023-12-05 PROCEDURE — 99213 OFFICE O/P EST LOW 20 MIN: CPT | Mod: PBBFAC

## 2023-12-05 NOTE — PROGRESS NOTES
[facility] General Surgery Clinic Note    HPI: Rowena Whitney is a 33 y.o. female with a history of 1 cm fat containing umbilical hernia now s/p open repair on 23 who presents for 2 week post op follow up. Patient has been doing great since her procedure and reports no complaints. She states she never had any pain, never required any pain medications. Denies any nausea, vomiting, fevers, chills, or changes in bowel habits. She has returned to her normal activity level without any issues.     PMH: No past medical history on file.     Meds:   Current Outpatient Medications:     HYDROcodone-acetaminophen (NORCO) 5-325 mg per tablet, Take 1 tablet by mouth every 6 (six) hours as needed for Pain. (Patient not taking: Reported on 2023), Disp: 10 tablet, Rfl: 0    medroxyPROGESTERone (DEPO-PROVERA) 150 mg/mL Syrg, use as directed, Disp: , Rfl:     ondansetron (ZOFRAN) 4 MG tablet, Take 1 tablet (4 mg total) by mouth 2 (two) times daily. (Patient not taking: Reported on 2023), Disp: 15 tablet, Rfl: 0    Allergies:   Review of patient's allergies indicates:   Allergen Reactions    Codeine      Social History:   Social History     Tobacco Use    Smoking status: Never     Passive exposure: Never    Smokeless tobacco: Never   Substance Use Topics    Alcohol use: Not Currently     Comment: occ    Drug use: Never     Family History: No family history on file.    Surgical History:   Past Surgical History:   Procedure Laterality Date     SECTION       SECTION      UMBILICAL HERNIA REPAIR N/A 2023    Procedure: REPAIR, HERNIA, UMBILICAL, AGE 5 YEARS OR OLDER;  Surgeon: Juan David Bautista Jr., MD;  Location: [facility];  Service: General;  Laterality: N/A;     Review of Systems:  Negative unless otherwise stated in HPI.    Objective:    Vitals:  Vitals:    23 1006   BP: 116/80   Pulse: 71   Resp: 18      Physical Exam:  Gen: NAD  Neuro: awake, alert, answering questions appropriately  CV:  RRR, +2 radial pulses.  Resp: non-labored breathing on room air  Abd: soft, ND, NT. Periumbilical incision site well-approximated and healing well without evidence of induration, erythema, or drainage.   Ext: moves all 4 spontaneously and purposefully  Skin: warm, well perfused    Assessment/Plan:  Rowena Whitney is a 33 y.o. female with a history of 1 cm fat containing umbilical hernia now s/p repair on 11/20/23 who presents for 2 week post op follow up.     - RTC PRN  - Counseled patient on no heavy lifting for 6 weeks postoperatively    Chitra Walton MS3  12/05/2023 10:20 AM     Patient seen and examined with medical student. Note written with assistance from medical student and edited as needed above.     Rogelio Bernard MD  [employer] General Surgery, PGY-3

## 2023-12-05 NOTE — PROGRESS NOTES
I have reviewed the notes, assessments, and/or procedures performed by the resident, I concur with her/his documentation of Rowena Whitney.     Roseann Gabriel MD

## 2023-12-12 ENCOUNTER — HOSPITAL ENCOUNTER (EMERGENCY)
Facility: HOSPITAL | Age: 33
Discharge: HOME | End: 2023-12-12
Attending: EMERGENCY MEDICINE
Payer: MEDICAID

## 2023-12-12 VITALS
RESPIRATION RATE: 18 BRPM | BODY MASS INDEX: 27.88 KG/M2 | SYSTOLIC BLOOD PRESSURE: 132 MMHG | TEMPERATURE: 99 F | HEIGHT: 60 IN | HEART RATE: 87 BPM | WEIGHT: 142 LBS | DIASTOLIC BLOOD PRESSURE: 81 MMHG | OXYGEN SATURATION: 99 %

## 2023-12-12 DIAGNOSIS — J06.9 VIRAL URI WITH COUGH: Primary | ICD-10-CM

## 2023-12-12 LAB
FLUAV AG UPPER RESP QL IA.RAPID: NOT DETECTED
FLUBV AG UPPER RESP QL IA.RAPID: NOT DETECTED
SARS-COV-2 RNA RESP QL NAA+PROBE: NOT DETECTED

## 2023-12-12 PROCEDURE — 63600175 PHARM REV CODE 636 W HCPCS

## 2023-12-12 PROCEDURE — 0240U COVID/FLU A&B PCR: CPT | Performed by: EMERGENCY MEDICINE

## 2023-12-12 PROCEDURE — 25000003 PHARM REV CODE 250

## 2023-12-12 PROCEDURE — 99284 EMERGENCY DEPT VISIT MOD MDM: CPT

## 2023-12-12 PROCEDURE — 96372 THER/PROPH/DIAG INJ SC/IM: CPT

## 2023-12-12 RX ORDER — CETIRIZINE HYDROCHLORIDE 10 MG/1
10 TABLET ORAL NIGHTLY
Qty: 30 TABLET | Refills: 0 | Status: SHIPPED | OUTPATIENT
Start: 2023-12-12 | End: 2024-01-11

## 2023-12-12 RX ORDER — FLUTICASONE PROPIONATE 50 MCG
1 SPRAY, SUSPENSION (ML) NASAL 2 TIMES DAILY PRN
Qty: 15 G | Refills: 0 | Status: SHIPPED | OUTPATIENT
Start: 2023-12-12

## 2023-12-12 RX ORDER — DEXAMETHASONE SODIUM PHOSPHATE 4 MG/ML
8 INJECTION, SOLUTION INTRA-ARTICULAR; INTRALESIONAL; INTRAMUSCULAR; INTRAVENOUS; SOFT TISSUE
Status: COMPLETED | OUTPATIENT
Start: 2023-12-12 | End: 2023-12-12

## 2023-12-12 RX ORDER — ACETAMINOPHEN 500 MG
1000 TABLET ORAL
Status: COMPLETED | OUTPATIENT
Start: 2023-12-12 | End: 2023-12-12

## 2023-12-12 RX ADMIN — ACETAMINOPHEN 1000 MG: 500 TABLET ORAL at 08:12

## 2023-12-12 RX ADMIN — DEXAMETHASONE SODIUM PHOSPHATE 8 MG: 4 INJECTION, SOLUTION INTRA-ARTICULAR; INTRALESIONAL; INTRAMUSCULAR; INTRAVENOUS; SOFT TISSUE at 09:12

## 2023-12-12 NOTE — Clinical Note
"Andrei"andrei devonte" Devonte was seen and treated in our emergency department on 12/12/2023.  She may return to work on 12/14/2023.       If you have any questions or concerns, please don't hesitate to call.      Lilo ALBARRAN    "

## 2023-12-13 NOTE — ED PROVIDER NOTES
Encounter Date: 2023       History     Chief Complaint   Patient presents with    Cough     Rpeorts headache cough and congestion;      The patient is a 33 y.o. female who presents to the Emergency Department with a chief complaint of nasal congestion. Symptoms began 4 days ago and have been constant since onset. Her pain is currently rated as a 0/10 in severity. Associated symptoms include cough, sneezing, and sinus pressure. Symptoms are aggravated with nothing and there are no alleviating factors. The patient denies chest pain, shortness of breath, fever, or chills. She reports taking nothing prior to arrival with no relief of symptoms. No other reported symptoms at this time.      The history is provided by the patient. No  was used.   Cough  This is a new problem. The current episode started several days ago. The problem occurs every few minutes. The problem has been unchanged. The cough is Non-productive. There has been no fever. Associated symptoms include rhinorrhea. Pertinent negatives include no chest pain, no chills and no sore throat. She has tried decongestants for the symptoms. The treatment provided no relief. She is not a smoker.     Review of patient's allergies indicates:   Allergen Reactions    Codeine      History reviewed. No pertinent past medical history.  Past Surgical History:   Procedure Laterality Date     SECTION       SECTION      UMBILICAL HERNIA REPAIR N/A 2023    Procedure: REPAIR, HERNIA, UMBILICAL, AGE 5 YEARS OR OLDER;  Surgeon: Juan David Bautista Jr., MD;  Location: [facility];  Service: General;  Laterality: N/A;     History reviewed. No pertinent family history.  Social History     Tobacco Use    Smoking status: Never     Passive exposure: Never    Smokeless tobacco: Never   Substance Use Topics    Alcohol use: Not Currently     Comment: occ    Drug use: Never     Review of Systems   Constitutional:  Negative for chills and fever.    HENT:  Positive for congestion, rhinorrhea and sinus pressure. Negative for sore throat.    Respiratory:  Positive for cough. Negative for chest tightness.    Cardiovascular:  Negative for chest pain.   Gastrointestinal:  Negative for abdominal pain, nausea and vomiting.   All other systems reviewed and are negative.      Physical Exam     Initial Vitals [12/12/23 1928]   BP Pulse Resp Temp SpO2   132/81 87 18 98.5 °F (36.9 °C) 99 %      MAP       --         Physical Exam    Nursing note and vitals reviewed.  Constitutional: She appears well-developed and well-nourished.   HENT:   Head: Normocephalic.   Right Ear: Hearing and tympanic membrane normal.   Left Ear: Hearing and tympanic membrane normal.   Nose: Rhinorrhea present. Right sinus exhibits no maxillary sinus tenderness and no frontal sinus tenderness. Left sinus exhibits no maxillary sinus tenderness and no frontal sinus tenderness.   Mouth/Throat: Uvula is midline, oropharynx is clear and moist and mucous membranes are normal.   Cardiovascular:  Normal rate, regular rhythm, normal heart sounds and normal pulses.           Pulmonary/Chest: Effort normal and breath sounds normal.   Abdominal: Abdomen is soft. Bowel sounds are normal. There is no abdominal tenderness.     Lymphadenopathy:     She has no cervical adenopathy.   Neurological: She is alert. GCS eye subscore is 4. GCS verbal subscore is 5. GCS motor subscore is 6.   Skin: Skin is warm, dry and intact. Capillary refill takes less than 2 seconds.         ED Course   Procedures  Labs Reviewed   COVID/FLU A&B PCR - Normal    Narrative:     The Xpert Xpress SARS-CoV-2/FLU/RSV plus is a rapid, multiplexed real-time PCR test intended for the simultaneous qualitative detection and differentiation of SARS-CoV-2, Influenza A, Influenza B, and respiratory syncytial virus (RSV) viral RNA in either nasopharyngeal swab or nasal swab specimens.                Imaging Results    None          Medications    dexAMETHasone injection 8 mg (has no administration in time range)   acetaminophen tablet 1,000 mg (1,000 mg Oral Given 12/12/23 2024)     Medical Decision Making  The patient is a 33 y.o. female who presents to the Emergency Department with a chief complaint of nasal congestion. Symptoms began 4 days ago and have been constant since onset. Her pain is currently rated as a 0/10 in severity. Associated symptoms include cough, sneezing, and sinus pressure. Symptoms are aggravated with nothing and there are no alleviating factors. The patient denies chest pain, shortness of breath, fever, or chills. She reports taking nothing prior to arrival with no relief of symptoms. No other reported symptoms at this time.      Differential diagnosis include but are not limited to viral URI, bronchitis, COVID, influenza    Problems Addressed:  Viral URI with cough: acute illness or injury    Risk  OTC drugs.  Prescription drug management.               ED Course as of 12/12/23 2113   Tue Dec 12, 2023   2107 Patient reports improvement of her headache after Tylenol.  I have discussed results in detail with the patient including follow up.  She is amenable to plan and ready for discharge home. [LM]      ED Course User Index  [LM] Coco Licona NP                           Clinical Impression:  Final diagnoses:  [J06.9] Viral URI with cough (Primary)          ED Disposition Condition    Discharge Stable          ED Prescriptions       Medication Sig Dispense Start Date End Date Auth. Provider    cetirizine (ZYRTEC) 10 MG tablet Take 1 tablet (10 mg total) by mouth every evening. 30 tablet 12/12/2023 1/11/2024 Coco Licona NP    fluticasone propionate (FLONASE) 50 mcg/actuation nasal spray 1 spray (50 mcg total) by Each Nostril route 2 (two) times daily as needed for Rhinitis. 15 g 12/12/2023 -- Coco Licona NP          Follow-up Information       Follow up With Specialties Details Why Contact Info    Primary care  provider  Schedule an appointment as soon as possible for a visit                Coco Licona NP  12/12/23 1778

## 2023-12-21 ENCOUNTER — HOSPITAL ENCOUNTER (EMERGENCY)
Facility: HOSPITAL | Age: 33
Discharge: HOME | End: 2023-12-21
Attending: EMERGENCY MEDICINE
Payer: MEDICAID

## 2023-12-21 VITALS
DIASTOLIC BLOOD PRESSURE: 80 MMHG | RESPIRATION RATE: 18 BRPM | TEMPERATURE: 98 F | WEIGHT: 142 LBS | HEIGHT: 60 IN | HEART RATE: 88 BPM | OXYGEN SATURATION: 100 % | SYSTOLIC BLOOD PRESSURE: 119 MMHG | BODY MASS INDEX: 27.88 KG/M2

## 2023-12-21 DIAGNOSIS — L25.9 CONTACT DERMATITIS, UNSPECIFIED CONTACT DERMATITIS TYPE, UNSPECIFIED TRIGGER: Primary | ICD-10-CM

## 2023-12-21 PROCEDURE — 63600175 PHARM REV CODE 636 W HCPCS

## 2023-12-21 PROCEDURE — 25000003 PHARM REV CODE 250

## 2023-12-21 PROCEDURE — 96372 THER/PROPH/DIAG INJ SC/IM: CPT

## 2023-12-21 PROCEDURE — 99284 EMERGENCY DEPT VISIT MOD MDM: CPT

## 2023-12-21 RX ORDER — FAMOTIDINE 20 MG/1
20 TABLET, FILM COATED ORAL
Status: COMPLETED | OUTPATIENT
Start: 2023-12-21 | End: 2023-12-21

## 2023-12-21 RX ORDER — FAMOTIDINE 20 MG/1
20 TABLET, FILM COATED ORAL 2 TIMES DAILY
Qty: 14 TABLET | Refills: 0 | Status: SHIPPED | OUTPATIENT
Start: 2023-12-21 | End: 2023-12-28

## 2023-12-21 RX ORDER — HYDROXYZINE PAMOATE 25 MG/1
25 CAPSULE ORAL EVERY 8 HOURS PRN
Qty: 20 CAPSULE | Refills: 0 | Status: SHIPPED | OUTPATIENT
Start: 2023-12-21

## 2023-12-21 RX ORDER — PREDNISONE 20 MG/1
40 TABLET ORAL DAILY
Qty: 10 EACH | Refills: 0 | Status: SHIPPED | OUTPATIENT
Start: 2023-12-21 | End: 2023-12-26

## 2023-12-21 RX ORDER — DEXAMETHASONE SODIUM PHOSPHATE 4 MG/ML
8 INJECTION, SOLUTION INTRA-ARTICULAR; INTRALESIONAL; INTRAMUSCULAR; INTRAVENOUS; SOFT TISSUE
Status: COMPLETED | OUTPATIENT
Start: 2023-12-21 | End: 2023-12-21

## 2023-12-21 RX ADMIN — FAMOTIDINE 20 MG: 20 TABLET, FILM COATED ORAL at 07:12

## 2023-12-21 RX ADMIN — DEXAMETHASONE SODIUM PHOSPHATE 8 MG: 4 INJECTION, SOLUTION INTRA-ARTICULAR; INTRALESIONAL; INTRAMUSCULAR; INTRAVENOUS; SOFT TISSUE at 07:12

## 2023-12-22 NOTE — ED PROVIDER NOTES
Encounter Date: 2023       History     Chief Complaint   Patient presents with    Rash     Pt complaint of a rash to face and abd relating that she was assisting in a tree removal     The patient is a 33 y.o. female who presents to the Emergency Department with a chief complaint of rash.  Patient reports that she started with a rash to her face, abdomen, and bilateral arms after working outside in the ER today.  Symptoms began today and have been constant since onset. Her pain is currently rated as a 2/10 in severity and described as itching with no radiation. Associated symptoms include itching. Symptoms are aggravated with nothing and there are no alleviating factors. The patient denies fever, shortness of breath, or wheezing. She reports taking nothing prior to arrival with no relief of symptoms. No other reported symptoms at this time.      The history is provided by the patient. No  was used.   Rash   This is a new problem. The current episode started several hours ago. The problem has been unchanged. The problem is associated with an unknown factor. The rash is present on the back, torso and face. The pain is at a severity of 2/10. The pain has been Constant since onset. Associated symptoms include itching. Pertinent negatives include no blisters, no pain and no weeping. She has tried nothing for the symptoms. The treatment provided no relief.     Review of patient's allergies indicates:   Allergen Reactions    Codeine      History reviewed. No pertinent past medical history.  Past Surgical History:   Procedure Laterality Date     SECTION       SECTION      UMBILICAL HERNIA REPAIR N/A 2023    Procedure: REPAIR, HERNIA, UMBILICAL, AGE 5 YEARS OR OLDER;  Surgeon: Juan David Bautista Jr., MD;  Location: [facility];  Service: General;  Laterality: N/A;     History reviewed. No pertinent family history.  Social History     Tobacco Use    Smoking status: Never     Passive  exposure: Never    Smokeless tobacco: Never   Substance Use Topics    Alcohol use: Not Currently     Comment: occ    Drug use: Never     Review of Systems   Constitutional:  Negative for fever.   HENT:  Negative for sore throat.    Respiratory:  Negative for shortness of breath.    Cardiovascular:  Negative for chest pain.   Gastrointestinal:  Negative for nausea.   Genitourinary:  Negative for dysuria.   Musculoskeletal:  Negative for back pain.   Skin:  Positive for itching and rash.   Neurological:  Negative for weakness.   Hematological:  Does not bruise/bleed easily.   Psychiatric/Behavioral:  Negative for behavioral problems.    All other systems reviewed and are negative.      Physical Exam     Initial Vitals [12/21/23 1904]   BP Pulse Resp Temp SpO2   119/80 88 18 98.4 °F (36.9 °C) 100 %      MAP       --         Physical Exam    Nursing note and vitals reviewed.  Constitutional: She appears well-developed and well-nourished.   HENT:   Head: Normocephalic.   Right Ear: Hearing and tympanic membrane normal.   Left Ear: Hearing and tympanic membrane normal.   Mouth/Throat: Uvula is midline, oropharynx is clear and moist and mucous membranes are normal.   Eyes: Conjunctivae and EOM are normal. Pupils are equal, round, and reactive to light.   Cardiovascular:  Normal rate, regular rhythm, normal heart sounds and normal pulses.           Pulmonary/Chest: Effort normal and breath sounds normal.   Abdominal: Abdomen is soft. Bowel sounds are normal. There is no abdominal tenderness.     Lymphadenopathy:     She has no cervical adenopathy.   Neurological: She is alert. GCS eye subscore is 4. GCS verbal subscore is 5. GCS motor subscore is 6.   Skin: Skin is warm and dry. Capillary refill takes less than 2 seconds. Rash noted.   Scattered patches of red, maculopapular rash to bilateral arms, chest, face, and upper back. No blistering present.          ED Course   Procedures  Labs Reviewed - No data to display        Imaging Results    None          Medications   dexAMETHasone injection 8 mg (8 mg Intramuscular Given 12/21/23 1922)   famotidine tablet 20 mg (20 mg Oral Given 12/21/23 1922)     Medical Decision Making  The patient is a 33 y.o. female who presents to the Emergency Department with a chief complaint of rash.  Patient reports that she started with a rash to her face, abdomen, and bilateral arms after working outside in the ER today.  Symptoms began today and have been constant since onset. Her pain is currently rated as a 2/10 in severity and described as itching with no radiation. Associated symptoms include itching. Symptoms are aggravated with nothing and there are no alleviating factors. The patient denies fever, shortness of breath, or wheezing. She reports taking nothing prior to arrival with no relief of symptoms. No other reported symptoms at this time.      Differential diagnosis include but are not limited to allergic reaction, contact dermatitis    Risk  Prescription drug management.               ED Course as of 12/21/23 2027   Thu Dec 21, 2023   2017 Patient reports some improvement of her symptoms after medications.  Patient unable to take benadryl while in the emergency department because she drove here and does not have ride home.  States that this reaction happened started this morning.  She states that she will take dose of Vistaril when she gets home.  Patient denies any chest pain, shortness of breath, or wheezing.  She was given strict ER return precautions.  She is amenable to plan and ready for discharge home [LM]      ED Course User Index  [LM] Coco Licona NP                           Clinical Impression:  Final diagnoses:  [L25.9] Contact dermatitis, unspecified contact dermatitis type, unspecified trigger (Primary)          ED Disposition Condition    Discharge Stable          ED Prescriptions       Medication Sig Dispense Start Date End Date Auth. Provider    predniSONE  (DELTASONE) 20 MG tablet Take 2 tablets (40 mg total) by mouth once daily. for 5 days 10 each 12/21/2023 12/26/2023 Coco Licona NP    famotidine (PEPCID) 20 MG tablet Take 1 tablet (20 mg total) by mouth 2 (two) times daily. for 7 days 14 tablet 12/21/2023 12/28/2023 Coco Licona NP    hydrOXYzine pamoate (VISTARIL) 25 MG Cap Take 1 capsule (25 mg total) by mouth every 8 (eight) hours as needed (Itching). 20 capsule 12/21/2023 -- Coco Licona NP          Follow-up Information       Follow up With Specialties Details Why Contact Info    Claudia Rivero, [facility] Schedule an appointment as soon as possible for a visit   [address] [place] [id number]  [phone]               Coco Licona NP  12/21/23 2022       Coco Licona NP  12/21/23 2027

## 2024-03-11 PROBLEM — Z09 S/P UMBILICAL HERNIA REPAIR, FOLLOW-UP EXAM: Status: RESOLVED | Noted: 2023-12-05 | Resolved: 2024-03-11

## 2024-03-21 ENCOUNTER — HOSPITAL ENCOUNTER (EMERGENCY)
Facility: HOSPITAL | Age: 34
Discharge: HOME | End: 2024-03-21
Attending: INTERNAL MEDICINE
Payer: MEDICAID

## 2024-03-21 VITALS
HEIGHT: 60 IN | TEMPERATURE: 99 F | OXYGEN SATURATION: 100 % | BODY MASS INDEX: 27.48 KG/M2 | WEIGHT: 140 LBS | HEART RATE: 72 BPM | RESPIRATION RATE: 18 BRPM | DIASTOLIC BLOOD PRESSURE: 80 MMHG | SYSTOLIC BLOOD PRESSURE: 120 MMHG

## 2024-03-21 DIAGNOSIS — L02.212 CUTANEOUS ABSCESS OF BACK (ANY PART, EXCEPT BUTTOCK): Primary | ICD-10-CM

## 2024-03-21 PROCEDURE — 99284 EMERGENCY DEPT VISIT MOD MDM: CPT | Mod: 25

## 2024-03-21 PROCEDURE — 10060 I&D ABSCESS SIMPLE/SINGLE: CPT

## 2024-03-21 RX ORDER — MUPIROCIN 20 MG/G
OINTMENT TOPICAL 3 TIMES DAILY
Qty: 22 G | Refills: 0 | Status: SHIPPED | OUTPATIENT
Start: 2024-03-21

## 2024-03-21 RX ORDER — SULFAMETHOXAZOLE AND TRIMETHOPRIM 800; 160 MG/1; MG/1
1 TABLET ORAL 2 TIMES DAILY
Qty: 20 TABLET | Refills: 0 | Status: SHIPPED | OUTPATIENT
Start: 2024-03-21 | End: 2024-03-31

## 2024-03-21 NOTE — ED PROVIDER NOTES
Source of History:  Patient, no limitations    Chief complaint:  Abscess (Pt complaint of a boil to back that is worsening over the past 4 months)      HPI:  Rowena Whitney is a 34 y.o. female presenting with Abscess (Pt complaint of a boil to back that is worsening over the past 4 months)         Abscess: Patient presents for evaluation of a cutaneous abscess. Lesion is located in the posterior left bra line. Onset was several days ago. Symptoms have gradually worsened. Abscess has associated symptoms of pain. Patient does not have previous history of cutaneous abscesses. Patient does not have diabetes.        Review of Systems   Constitutional symptoms:  Negative except as documented in HPI.   Skin symptoms:  Negative except as documented in HPI.   HEENT symptoms:  Negative except as documented in HPI.   Respiratory symptoms:  Negative except as documented in HPI.   Cardiovascular symptoms:  Negative except as documented in HPI.   Gastrointestinal symptoms:  Negative except as documented in HPI.    Genitourinary symptoms:  Negative except as documented in HPI.   Musculoskeletal symptoms:  Negative except as documented in HPI.   Neurologic symptoms:  Negative except as documented in HPI.   Psychiatric symptoms:  Negative except as documented in HPI.   Allergy/immunologic symptoms:  Negative except as documented in HPI.             Additional review of systems information: All other systems reviewed and otherwise negative.      Review of patient's allergies indicates:   Allergen Reactions    Codeine        PMH:  As per HPI and below:    History reviewed. No pertinent past medical history.     History reviewed. No pertinent family history.    Past Surgical History:   Procedure Laterality Date     SECTION       SECTION      UMBILICAL HERNIA REPAIR N/A 2023    Procedure: REPAIR, HERNIA, UMBILICAL, AGE 4 YEARS OR OLDER;  Surgeon: Juan David Bautista Jr., MD;  Location: [facility] OR;   Service: General;  Laterality: N/A;       Social History     Tobacco Use    Smoking status: Never     Passive exposure: Never    Smokeless tobacco: Never   Substance Use Topics    Alcohol use: Not Currently     Comment: occ    Drug use: Never       Patient Active Problem List   Diagnosis   (none) - all problems resolved or deleted        Physical Exam:    /80 (BP Location: Left arm, Patient Position: Sitting)   Pulse 72   Temp 98.5 °F (36.9 °C) (Oral)   Resp 18   Ht 5' (1.524 m)   Wt 63.5 kg (140 lb)   SpO2 100%   BMI 27.34 kg/m²     Nursing note and vital signs reviewed.    General:  Alert, no acute distress.   Skin: Normal for Ethnic Origin, No cyanosis, simple single boil posterior left bra line ready for I&D  HEENT: Normocephalic and atraumatic, Vision unchanged, Pupils symmetric, No icterus , Nasal mucosa is pink and moist  Cardiovascular:  Regular rate and rhythm  Chest Wall: No deformity, equal chest rise  Respiratory:  Lungs are clear to auscultation, respirations are non-labored.    Musculoskeletal:  No deformity, Normal perfusion to all extremities  Gastrointestinal:  Soft, Non distended  Neurological:  Alert and oriented, normal motor observed, normal speech observed.    Psychiatric:  Cooperative, appropriate mood & affect.        Labs that have been ordered have been independently reviewed and interpreted by myself.     Old Chart Reviewed.      Initial Impression/ Differential Dx:  Cellulitis, abscess, necrotizing fasciitis, osteomyelitis, septic joint, MRSA, DVT, drug eruption, allergic/contact dermatitis, EM/SJS, viral exanthem, local trauma/contusion      MDM:      Reviewed Nurses Note.    Reviewed Pertinent old records.    Orders Placed This Encounter    INCISION AND DRAINAGE    sulfamethoxazole-trimethoprim 800-160mg (BACTRIM DS) 800-160 mg Tab    mupirocin (BACTROBAN) 2 % ointment                    Labs Reviewed - No data to display       No orders to display        No visits with  results within 1 Day(s) from this visit.   Latest known visit with results is:   Admission on 12/12/2023, Discharged on 12/12/2023   Component Date Value Ref Range Status    Influenza A PCR 12/12/2023 Not Detected  Not Detected Final    Influenza B PCR 12/12/2023 Not Detected  Not Detected Final    SARS-CoV-2 PCR 12/12/2023 Not Detected  Not Detected, Negative Final       Imaging Results    None                                          I & D - Incision and Drainage    Date/Time: 3/21/2024 9:23 AM  Location procedure was performed: [facility] EMERGENCY DEPARTMENT    Performed by: Manuel Fay DO  Authorized by: Manuel Fay DO  Consent Done: Yes  Consent: Verbal consent obtained.  Risks and benefits: risks, benefits and alternatives were discussed  Consent given by: patient  Patient identity confirmed: name and verbally with patient  Type: abscess  Body area: trunk  Location details: back  Anesthesia method: none.    Anesthesia:  Anesthetic total: 0 mL    Patient sedated: no  Description of findings: boil posterior bra line   Scalpel size: 18 g needle.  Incision depth: dermal  Complexity: simple  Drainage: purulent  Drainage amount: moderate  Wound treatment: wound left open and expression of material  Patient tolerance: Patient tolerated the procedure well with no immediate complications    Incision depth: dermal            Diagnostic Impression:    1. Cutaneous abscess of back (any part, except buttock)         ED Disposition Condition    Discharge Stable             Follow-up Information       [facility] - Emergency Dept.    Specialty: Emergency Medicine  Why: If symptoms worsen  Contact information:  [address]  [facility] [id number] [phone]                            ED Prescriptions       Medication Sig Dispense Start Date End Date Auth. Provider    sulfamethoxazole-trimethoprim 800-160mg (BACTRIM DS) 800-160 mg Tab Take 1 tablet by mouth 2 (two)  times daily. for 10 days 20 tablet 3/21/2024 3/31/2024 Manuel Fay DO    mupirocin (BACTROBAN) 2 % ointment Apply topically 3 (three) times daily. 22 g 3/21/2024 -- Manuel Fay DO          Follow-up Information       Follow up With Specialties Details Why Contact Info    [facility] - Emergency Dept Emergency Medicine  If symptoms worsen [address]  [facility] [id number] [phone]             Manuel Fay DO  03/21/24 [id number]

## 2024-07-21 ENCOUNTER — HOSPITAL ENCOUNTER (EMERGENCY)
Facility: HOSPITAL | Age: 34
Discharge: HOME | End: 2024-07-21
Attending: EMERGENCY MEDICINE
Payer: MEDICAID

## 2024-07-21 VITALS
RESPIRATION RATE: 18 BRPM | OXYGEN SATURATION: 100 % | SYSTOLIC BLOOD PRESSURE: 107 MMHG | HEART RATE: 78 BPM | TEMPERATURE: 98 F | HEIGHT: 60 IN | WEIGHT: 137 LBS | BODY MASS INDEX: 26.9 KG/M2 | DIASTOLIC BLOOD PRESSURE: 76 MMHG

## 2024-07-21 DIAGNOSIS — G51.0 BELL'S PALSY: Primary | ICD-10-CM

## 2024-07-21 PROCEDURE — 99284 EMERGENCY DEPT VISIT MOD MDM: CPT

## 2024-07-21 RX ORDER — FAMCICLOVIR 500 MG/1
500 TABLET ORAL 3 TIMES DAILY
Qty: 21 TABLET | Refills: 0 | Status: SHIPPED | OUTPATIENT
Start: 2024-07-21 | End: 2024-07-28

## 2024-07-21 RX ORDER — PREDNISONE 20 MG/1
60 TABLET ORAL DAILY
Qty: 15 TABLET | Refills: 0 | Status: SHIPPED | OUTPATIENT
Start: 2024-07-21 | End: 2024-07-26

## 2024-07-21 NOTE — ED PROVIDER NOTES
Encounter Date: 2024       History     Chief Complaint   Patient presents with    Numbness     C/o bells palsy to left side of face onset Tuesday. Hx. Of this 7 years ago. Denies any other deficits.      The history is provided by the patient.   General Illness   The current episode started several days ago. The problem occurs rarely. The problem has been unchanged. Nothing relieves the symptoms. Nothing aggravates the symptoms. Pertinent negatives include no fever, no nausea, no sore throat, no shortness of breath and no rash.   Had Bell's palsy 7 years ago and thinks she has it again.  Noticed funny feeling to left tongue 5 days ago but facial drooping began 2 days ago.    Review of patient's allergies indicates:   Allergen Reactions    Codeine      History reviewed. No pertinent past medical history.  Past Surgical History:   Procedure Laterality Date     SECTION       SECTION      UMBILICAL HERNIA REPAIR N/A 2023    Procedure: REPAIR, HERNIA, UMBILICAL, AGE 4 YEARS OR OLDER;  Surgeon: Juan David Bautista Jr., MD;  Location: [facility];  Service: General;  Laterality: N/A;     No family history on file.  Social History     Tobacco Use    Smoking status: Never     Passive exposure: Never    Smokeless tobacco: Never   Substance Use Topics    Alcohol use: Not Currently     Comment: occ    Drug use: Never     Review of Systems   Constitutional:  Negative for fever.   HENT:  Negative for sore throat.    Respiratory:  Negative for shortness of breath.    Cardiovascular:  Negative for chest pain.   Gastrointestinal:  Negative for nausea.   Genitourinary:  Negative for dysuria.   Musculoskeletal:  Negative for back pain.   Skin:  Negative for rash.   Neurological:  Negative for weakness.   Hematological:  Does not bruise/bleed easily.       Physical Exam     Initial Vitals [24 1241]   BP Pulse Resp Temp SpO2   107/76 78 18 98.3 °F (36.8 °C) 100 %      MAP       --         Physical  Exam    Nursing note and vitals reviewed.  Constitutional: She appears well-developed and well-nourished.   HENT:   Head: Normocephalic and atraumatic.   Right Ear: External ear normal.   Left Ear: External ear normal.   Eyes: Conjunctivae and EOM are normal. Pupils are equal, round, and reactive to light.   Neck: Neck supple.   Normal range of motion.  Cardiovascular:  Normal rate, regular rhythm, normal heart sounds and intact distal pulses.           Pulmonary/Chest: Breath sounds normal.   Abdominal: Abdomen is soft. Bowel sounds are normal.   Musculoskeletal:         General: Normal range of motion.      Cervical back: Normal range of motion and neck supple.     Neurological: She is alert and oriented to person, place, and time. A cranial nerve deficit (left VII nerve palsy, forehead muscles involved) is present. GCS score is 15. GCS eye subscore is 4. GCS verbal subscore is 5. GCS motor subscore is 6.   Skin: Skin is warm and dry. Capillary refill takes less than 2 seconds.   Psychiatric: She has a normal mood and affect. Her behavior is normal. Judgment and thought content normal.         ED Course   Procedures  Labs Reviewed - No data to display       Imaging Results    None          Medications - No data to display  Medical Decision Making  Risk  Prescription drug management.    Differential includes:  Bell's palsy, CVA.  Will treat for Bell's                                  Clinical Impression:  Final diagnoses:  [G51.0] Bell's palsy (Primary)          ED Disposition Condition    Discharge Stable          ED Prescriptions       Medication Sig Dispense Start Date End Date Auth. Provider    famciclovir (FAMVIR) 500 MG tablet Take 1 tablet (500 mg total) by mouth 3 (three) times daily. for 7 days 21 tablet 7/21/2024 7/28/2024 Jairon Caicedo MD    predniSONE (DELTASONE) 20 MG tablet Take 3 tablets (60 mg total) by mouth once daily. for 5 days 15 tablet 7/21/2024 7/26/2024 Jairon Caicedo MD     white petrolatum-mineral oil 56.8-42.5% (LACRI-LUBE S.O.P.) 56.8-42.5 % Oint Place into the left eye every evening. 15 g 7/21/2024 -- Jairon Caicedo MD          Follow-up Information       Follow up With Specialties Details Why Contact Info    Follow up with your primary care provider in 2 weeks if not improved                 Jairon Caicedo MD  07/21/24 [id number]

## 2025-06-04 ENCOUNTER — HOSPITAL ENCOUNTER (EMERGENCY)
Facility: HOSPITAL | Age: 35
Discharge: HOME | End: 2025-06-04
Attending: FAMILY MEDICINE
Payer: MEDICAID

## 2025-06-04 VITALS
HEART RATE: 83 BPM | BODY MASS INDEX: 27.48 KG/M2 | OXYGEN SATURATION: 98 % | WEIGHT: 140 LBS | SYSTOLIC BLOOD PRESSURE: 127 MMHG | DIASTOLIC BLOOD PRESSURE: 89 MMHG | HEIGHT: 60 IN | TEMPERATURE: 98 F | RESPIRATION RATE: 18 BRPM

## 2025-06-04 DIAGNOSIS — L25.9 CONTACT DERMATITIS, UNSPECIFIED CONTACT DERMATITIS TYPE, UNSPECIFIED TRIGGER: ICD-10-CM

## 2025-06-04 DIAGNOSIS — R21 RASH: Primary | ICD-10-CM

## 2025-06-04 PROCEDURE — 99284 EMERGENCY DEPT VISIT MOD MDM: CPT | Mod: 25

## 2025-06-04 PROCEDURE — 63600175 PHARM REV CODE 636 W HCPCS: Mod: JZ,TB | Performed by: FAMILY MEDICINE

## 2025-06-04 PROCEDURE — 96372 THER/PROPH/DIAG INJ SC/IM: CPT | Performed by: FAMILY MEDICINE

## 2025-06-04 RX ORDER — METHYLPREDNISOLONE SOD SUCC 125 MG
125 VIAL (EA) INJECTION
Status: COMPLETED | OUTPATIENT
Start: 2025-06-04 | End: 2025-06-04

## 2025-06-04 RX ORDER — PREDNISONE 20 MG/1
TABLET ORAL
Qty: 30 TABLET | Refills: 0 | Status: SHIPPED | OUTPATIENT
Start: 2025-06-04 | End: 2025-06-19

## 2025-06-04 RX ADMIN — METHYLPREDNISOLONE SODIUM SUCCINATE 125 MG: 125 INJECTION, POWDER, FOR SOLUTION INTRAMUSCULAR; INTRAVENOUS at 09:06

## 2025-06-05 NOTE — ED PROVIDER NOTES
Encounter Date: 2025       History     Chief Complaint   Patient presents with    Rash     Pt has rash starting Monday from poison ivy.     This is a 3 days of diffuse rash, got into poison ivy in her yd.  No other complaints.        Review of patient's allergies indicates:   Allergen Reactions    Codeine      No past medical history on file.  Past Surgical History:   Procedure Laterality Date     SECTION       SECTION      UMBILICAL HERNIA REPAIR N/A 2023    Procedure: REPAIR, HERNIA, UMBILICAL, AGE 4 YEARS OR OLDER;  Surgeon: Juan David Bautista Jr., MD;  Location: [facility];  Service: General;  Laterality: N/A;     No family history on file.  Social History[1]  Review of Systems   All other systems reviewed and are negative.      Physical Exam     Initial Vitals [25]   BP Pulse Resp Temp SpO2   127/89 83 18 98.1 °F (36.7 °C) 98 %      MAP       --         Physical Exam    Nursing note and vitals reviewed.  Constitutional: She appears well-developed and well-nourished.   HENT:   Head: Normocephalic and atraumatic.   Neck: Neck supple.   Pulmonary/Chest: No respiratory distress.   Abdominal: Abdomen is soft.   Musculoskeletal:      Cervical back: Neck supple.     Neurological: She is alert and oriented to person, place, and time.   Skin: Skin is warm.   Erythematous excoriated rash on bilateral extremities, face   Psychiatric: She has a normal mood and affect. Thought content normal.         ED Course   Procedures  Labs Reviewed - No data to display       Imaging Results    None          Medications   methylPREDNISolone sodium succinate injection 125 mg (has no administration in time range)     Medical Decision Making  Risk  Prescription drug management.                                      Clinical Impression:  Final diagnoses:  [R21] Rash (Primary)  [L25.9] Contact dermatitis, unspecified contact dermatitis type, unspecified trigger          ED Disposition Condition    Discharge  Stable          ED Prescriptions       Medication Sig Dispense Start Date End Date Auth. Provider    predniSONE (DELTASONE) 20 MG tablet Take 1 tablet (20 mg total) by mouth 3 (three) times daily for 5 days, THEN 1 tablet (20 mg total) 2 (two) times daily for 5 days, THEN 1 tablet (20 mg total) once daily for 5 days. 30 tablet 6/4/2025 6/19/2025 Alexandro Medrano Jr., MD          Follow-up Information    None                [1]   Social History  Tobacco Use    Smoking status: Never     Passive exposure: Never    Smokeless tobacco: Never   Substance Use Topics    Alcohol use: Not Currently     Comment: occ    Drug use: Never        Alexandro Medrano Jr., MD  06/04/25 2058

## (undated) DEVICE — APPLICATOR CHLORAPREP ORN 26ML

## (undated) DEVICE — SUT 2/0 30IN SILK BLK BRAI

## (undated) DEVICE — NDL HYPO REG 25G X 1 1/2

## (undated) DEVICE — SUT PDSII 4-0 PS-2 CLEAR MO

## (undated) DEVICE — GOWN SMARTSLEEVE AAMI LVL4 LG

## (undated) DEVICE — SYR 10CC LUER LOCK

## (undated) DEVICE — GOWN POLY REINF BRTH SLV XL

## (undated) DEVICE — ADHESIVE DERMABOND ADVANCED

## (undated) DEVICE — SUT ETHIBOND EXCEL 0 MO6 18

## (undated) DEVICE — HANDLE DEVON RIGID OR LIGHT

## (undated) DEVICE — KIT SURGICAL TURNOVER

## (undated) DEVICE — GLOVE SENSICARE PI GRN 6

## (undated) DEVICE — GLOVE SIGNATURE MICRO LTX 7

## (undated) DEVICE — MANIFOLD 4 PORT

## (undated) DEVICE — GLOVE SIGNATURE ESSNTL LTX 6

## (undated) DEVICE — SUT MCRYL PLUS 4-0 PS2 27IN

## (undated) DEVICE — GLOVE SENSICARE PI GRN 7.5

## (undated) DEVICE — GLOVE SENSICARE PI GRN 6.5

## (undated) DEVICE — SUT 3-0 VICRYL / SH (J416)

## (undated) DEVICE — SOL 9P NACL IRR PIC IL

## (undated) DEVICE — GLOVE SIGNATURE ESSNTL LTX 7.5

## (undated) DEVICE — Device

## (undated) DEVICE — GLOVE SIGNATURE MICRO LTX 6.5